# Patient Record
Sex: MALE | Race: WHITE
[De-identification: names, ages, dates, MRNs, and addresses within clinical notes are randomized per-mention and may not be internally consistent; named-entity substitution may affect disease eponyms.]

---

## 2019-01-31 ENCOUNTER — HOSPITAL ENCOUNTER (EMERGENCY)
Dept: HOSPITAL 25 - ED | Age: 59
Discharge: HOME | End: 2019-01-31
Payer: COMMERCIAL

## 2019-01-31 VITALS — DIASTOLIC BLOOD PRESSURE: 86 MMHG | SYSTOLIC BLOOD PRESSURE: 146 MMHG

## 2019-01-31 DIAGNOSIS — M79.671: Primary | ICD-10-CM

## 2019-01-31 PROCEDURE — 96372 THER/PROPH/DIAG INJ SC/IM: CPT

## 2019-01-31 PROCEDURE — 99282 EMERGENCY DEPT VISIT SF MDM: CPT

## 2019-01-31 NOTE — ED
Lower Extremity





- HPI Summary


HPI Summary: 


Pt. is a 58 y.o male who presents to the ER for right foot pain x several days. 

Pt. is a  for [place] [facility] [place] Pt. states he wore a pair of shoes that were 

too tight and now has pain to his right lateral foot. Pt. states this has 

happened in the past after wearing same shoes. Pt. states at that time he saw 

his podiatrist who said nerve was inflammed to foot and did a steroid injection 

and pain resolved. Pt. presenting requesting local steroid injection. Sxs are 

mild in severity. Driving and touching affected area makes sxs worse. Pt. 

states he called his podiatrist but they cannot see him for two weeks. Nothing 

makes sxs better. Pt. states he is having a hard time driving and working 

secondary to pain.








- History of Current Complaint


Chief Complaint: EDExtremityLower


Stated Complaint: FOOT PAIN


Time Seen by Provider: 01/31/19 07:33


Hx Obtained From: Patient


Pain Intensity: 3





- Allergies/Home Medications


Allergies/Adverse Reactions: 


 Allergies











Allergy/AdvReac Type Severity Reaction Status Date / Time


 


No Known Allergies Allergy   Verified 10/30/15 10:19














PMH/Surg Hx/FS Hx/Imm Hx


Previously Healthy: Yes


Infectious Disease History: No


Infectious Disease History: 


   Denies: Traveled Outside the [place] in Last 30 Days





- Family History


Known Family History: Positive: Non-Contributory





- Social History


Occupation: Employed Full-time


Lives: With Family


Alcohol Use: Occasionally


Substance Use Type: Reports: None


Smoking Status (MU): Never Smoked Tobacco





Review of Systems


Constitutional: Negative


Negative: Fever, Chills


Positive: Other - Right foot pain 


All Other Systems Reviewed And Are Negative: Yes





Physical Exam


Triage Information Reviewed: Yes


Vital Signs On Initial Exam: 


 Initial Vitals











Temp Pulse Resp BP Pulse Ox


 


 97.3 F   101   20   148/111   95 


 


 01/31/19 07:09  01/31/19 07:09  01/31/19 07:09  01/31/19 07:09  01/31/19 07:09











Vital Signs Reviewed: Yes


Appearance: Positive: Well-Appearing - Pt. sitting on bed in NAD, watching TV.


Skin: Positive: Warm, Dry


Head/Face: Positive: Normal Head/Face Inspection


Eyes: Positive: Normal, EOMI


Neck: Positive: Supple


Musculoskeletal: Positive: Other - Pain along right lateral foot. No overlying 

erthema or edema. No wounds


Neurological: Positive: Normal, CN Intact II-III


Psychiatric: Positive: Affect/Mood Appropriate





Diagnostics





- Vital Signs


 Vital Signs











  Temp Pulse Resp BP Pulse Ox


 


 01/31/19 07:09  97.3 F  101  20  148/111  95














- Laboratory


Lab Statement: Any lab studies that have been ordered have been reviewed, and 

results considered in the medical decision making process.





Lower Extremity Course/Dx





- Course


Course Of Treatment: Pt. presenting for right foot pain after wearing shoes 

that were too tight. No other trauma. No signs of infection on exam. Explained 

to pt. we do not do steroid injections in ED. He was given a dose of toradol. 

Pt. states he is going to try to get in with another podiatrist. Will return if 

sxs change or worsen.





- Diagnoses


Differential Diagnosis/HQI/PQRI: Positive: Arthritis, Cellulitis, Contusion, 

Gout, Infection, Sprain, Strain


Provider Diagnoses: 


 Foot pain








Discharge





- Sign-Out/Discharge


Documenting (check all that apply): Patient Departure


Patient Received Moderate/Deep Sedation with Procedure: No





- Discharge Plan


Condition: Good


Disposition: HOME


Patient Education Materials:  Peripheral Neuropathy (ED)


Referrals: 


Matthew Maciel MD [Primary Care Provider] - 


Additional Instructions: 


Call PCP or podiatry for an appointment


NSAIDS for pain as directed such as ibuprofen


Elevate foot and apply warm compresses


Return to ER if symptoms change or worsen





- Billing Disposition and Condition


Condition: GOOD


Disposition: Home